# Patient Record
Sex: MALE | Race: BLACK OR AFRICAN AMERICAN | Employment: UNEMPLOYED | URBAN - METROPOLITAN AREA
[De-identification: names, ages, dates, MRNs, and addresses within clinical notes are randomized per-mention and may not be internally consistent; named-entity substitution may affect disease eponyms.]

---

## 2017-06-15 ENCOUNTER — HOSPITAL ENCOUNTER (EMERGENCY)
Age: 47
Discharge: HOME | End: 2017-06-15
Attending: EMERGENCY MEDICINE
Payer: SELF-PAY

## 2017-06-15 VITALS
DIASTOLIC BLOOD PRESSURE: 101 MMHG | BODY MASS INDEX: 29.18 KG/M2 | HEART RATE: 70 BPM | HEIGHT: 69 IN | RESPIRATION RATE: 20 BRPM | SYSTOLIC BLOOD PRESSURE: 162 MMHG | WEIGHT: 197 LBS | OXYGEN SATURATION: 100 % | TEMPERATURE: 97.8 F

## 2017-06-15 DIAGNOSIS — Z00.00 NORMAL PHYSICAL EXAM: ICD-10-CM

## 2017-06-15 DIAGNOSIS — I10 HYPERTENSION, ESSENTIAL: ICD-10-CM

## 2017-06-15 DIAGNOSIS — R76.8 ABNORMAL HEPATITIS SEROLOGY: Primary | ICD-10-CM

## 2017-06-15 PROCEDURE — 87341 HEP B SURFACE AG NEUTRLZJ IA: CPT | Performed by: PHYSICIAN ASSISTANT

## 2017-06-15 PROCEDURE — 99281 EMR DPT VST MAYX REQ PHY/QHP: CPT

## 2017-06-15 PROCEDURE — 80074 ACUTE HEPATITIS PANEL: CPT | Performed by: PHYSICIAN ASSISTANT

## 2017-06-15 RX ORDER — LISINOPRIL 5 MG/1
5 TABLET ORAL DAILY
Qty: 30 TAB | Refills: 1 | Status: SHIPPED | OUTPATIENT
Start: 2017-06-15

## 2017-06-15 NOTE — DISCHARGE INSTRUCTIONS
Hepatitis B: Care Instructions  Your Care Instructions  Hepatitis B is a disease caused by a virus that infects the liver. It spreads through infected blood, semen, and other body fluids during sexual contact. It also can spread when people share needles to inject drugs or share things that may have blood on them. These include razors and toothbrushes. Needles used for tattoos, body piercing, or acupuncture can spread the disease if they are not cleaned the right way. After you get the virus, it may be 1 to 6 months before you see symptoms. You may never notice them. You can give the disease to other people before and after you have symptoms. Hepatitis B can make you tired. It can cause a fever, nausea, vomiting, light-colored stools, and dark urine. Your skin or eyes may look yellow. This is called jaundice. Most people get better in several weeks, but it can take several months. For some people the virus stays in their bodies. If the virus stays in your body for a long time, it can cause serious liver disease. After you have had the virus and feel better, you will not get it again. Follow-up care is a key part of your treatment and safety. Be sure to make and go to all appointments, and call your doctor if you are having problems. It's also a good idea to know your test results and keep a list of the medicines you take. How can you care for yourself at home? · Reduce your activity to match your energy level. · Avoid alcohol for as long as your doctor tells you to. This may be months. Alcohol can make liver problems worse. · Make sure your doctor knows all the medicines you take. Some medicines, such as acetaminophen (Tylenol), can make liver problems worse. Do not take any new medicines unless your doctor says it is okay. · Be safe with medicines. If your doctor prescribes antiviral medicine, take it exactly as prescribed.  Call your doctor if you think you are having a problem with your medicine. · If you have nausea or vomiting, try to eat smaller meals and eat more often. · Drink plenty of fluids, enough so that your urine is light yellow or clear like water. If you have kidney, heart, or liver disease and have to limit fluids, talk with your doctor before you increase the amount of fluids you drink. · If you have itchy skin, keep cool, stay out of the sun, and wear cotton clothing. Talk to your doctor about using over-the-counter medicines, such as diphenhydramine (Benadryl) or loratadine (Claritin), to control the itching. Read and follow the instructions on the label. To prevent spreading hepatitis B  · Tell the people you live with or have sex with about your illness as soon as possible. The CDC recommends that people in close contact with an infected person get the hepatitis B vaccine. · Do not donate blood or blood products, organs, semen, or eggs (ova). · Stop all sexual activity or use latex condoms. Do this until your doctor tells you that you can no longer give hepatitis B to others. Avoid anal contact with a sex partner while you are infected. · Do not share personal items that may have your blood on them. These include razors, toothbrushes, towels, and nail files. · Use lotions or ointments to prevent chapped or broken skin. These skin problems can expose others to your blood. · Tell your doctor, dentist, and anyone else who may come in contact with your blood about your illness. · If you are pregnant, tell the doctor who will deliver your baby about your illness. Be sure your baby gets medicine to prevent infection. This should start right after birth. · If you get blood on your clothing or other fabrics, clean them well. · Be sure to carefully get rid of sanitary napkins and tampons or other disposable items that have your blood on them. Place them in sealed plastic bags before you throw them away.   · Use a solution of 1 part bleach to 10 parts water to clean surfaces that have your blood or any other body fluid (such as semen or menstrual blood) on them. These surfaces include toilet seats, countertops, and floors. · If you have long-term hepatitis B, always use latex condoms during any sexual activity. You can infect others with the virus even if you do not have symptoms. When should you call for help? Call 911 anytime you think you may need emergency care. For example, call if:  · You passed out (lost consciousness). · You vomit blood or what looks like coffee grounds. · You are suddenly confused and can't think clearly. Call your doctor now or seek immediate medical care if:  · You are dizzy or lightheaded, or you feel like you may faint. · You have signs of needing more fluids. You have sunken eyes and a dry mouth, and you pass only a little dark urine. · You have nausea and vomiting that does not go away. Watch closely for changes in your health, and be sure to contact your doctor if:  · You do not get better as expected. Where can you learn more? Go to http://luis alfredo-miguel ángel.info/. Enter [phone] in the search box to learn more about \"Hepatitis B: Care Instructions. \"  Current as of: May 24, 2016  Content Version: 11.2  © 1044-0158 [employer]. Care instructions adapted under license by [employer] (which disclaims liability or warranty for this information). If you have questions about a medical condition or this instruction, always ask your healthcare professional. [place] [address] any warranty or liability for your use of this information.

## 2017-06-15 NOTE — ED TRIAGE NOTES
Patient here because he went to donate plasma and was told that he was reactive for hepatitis B on his screenings. Sepsis Screening completed    (  )Patient meets SIRS criteria. (x  )Patient does not meet SIRS criteria.       SIRS Criteria is achieved when two or more of the following are present   Temperature < 96.8°F (36°C) or > 100.9°F (38.3°C)   Heart Rate > 90 beats per minute   Respiratory Rate > 20 breaths per minute   WBC count > 12,000 or <4,000 or > 10% bands

## 2017-06-15 NOTE — ED PROVIDER NOTES
[address]  EMERGENCY DEPARTMENT HISTORY AND PHYSICAL EXAM       Date: 6/15/2017   Patient Name: Lori Millan   YOB: 1970  Medical Record Number: [id number]    History of Presenting Illness     Chief Complaint   Patient presents with    Abnormal Lab Results        History Provided By:  patient    Additional History:   4:26 PM  Lori Millan is a 55 y.o. male presenting to the ED C/O abnormal lab results, without C/O any sx. Pt states today he went to donate plasma today and was told he had markers for Hep B, upon review of results from a plasma donation last week. Pt has donated plasma many times in the past and has not had issues. Pt denies scleral icterus, abdominal pain, vomiting, and any other symptoms or complaints at this time. Primary Care Provider: Bela Barth MD     Past History     Past Medical History:   Past Medical History:   Diagnosis Date    Glaucoma     HTN (hypertension)     Hypertension     Migraine     Vertigo         Past Surgical History:   Past Surgical History:   Procedure Laterality Date    HX FEMUR FRACTURE TX      HX ORTHOPAEDIC          Family History:   History reviewed. No pertinent family history. Social History:   Social History   Substance Use Topics    Smoking status: Current Every Day Smoker     Packs/day: 0.50    Smokeless tobacco: None    Alcohol use Yes      Comment: drank last night        Allergies:   No Known Allergies     Review of Systems   Review of Systems   Gastrointestinal: Negative for abdominal pain and vomiting. All other systems reviewed and are negative. Physical Exam  Vitals:    06/15/17 1507   BP: (!) 162/101   Pulse: 70   Resp: 20   Temp: 97.8 °F (36.6 °C)   SpO2: 100%   Weight: 89.4 kg (197 lb)   Height: 5' 9\" (1.753 m)       Physical Exam   Constitutional: He is oriented to person, place, and time. He appears well-developed and well-nourished. No distress.    HENT:   Head: Normocephalic and atraumatic. Eyes: Conjunctivae and EOM are normal. Pupils are equal, round, and reactive to light. No scleral icterus. Neck: Normal range of motion. Neck supple. Cardiovascular: Normal rate and regular rhythm. Pulmonary/Chest: Effort normal and breath sounds normal.   Abdominal: Soft. Bowel sounds are normal. He exhibits no distension. There is no tenderness. There is no rebound and no guarding. Musculoskeletal: Normal range of motion. Neurological: He is alert and oriented to person, place, and time. Skin: Skin is warm and dry. No jaundice   Psychiatric: He has a normal mood and affect. His behavior is normal.   Nursing note and vitals reviewed. Diagnostic Study Results     Labs -    No results found for this or any previous visit (from the past 12 hour(s)). Radiologic Studies -  The following have been ordered and reviewed:  No orders to display           Medical Decision Making   I am the first provider for this patient. I reviewed the vital signs, available nursing notes, past medical history, past surgical history, family history and social history. Vital Signs-Reviewed the patient's vital signs. Patient Vitals for the past 12 hrs:   Temp Pulse Resp BP SpO2   06/15/17 1507 97.8 °F (36.6 °C) 70 20 (!) 162/101 100 %       Procedures:   Procedures    ED Course:  4:26 PM  Initial assessment performed. The patients presenting problems have been discussed, and they are in agreement with the care plan formulated and outlined with them. I have encouraged them to ask questions as they arise throughout their visit. Medications Given in the ED:  Medications - No data to display    Discharge Note:  4:40 PM   Pt has been reexamined. Patient has no new complaints, changes, or physical findings. Care plan outlined and precautions discussed. Results were reviewed with the patient. All medications were reviewed with the patient; will d/c home.  All of pt's questions and concerns were addressed. Patient was instructed and agrees to follow up with hepatology, as well as to return to the ED upon further deterioration. Patient is ready to go home. Diagnosis   Clinical Impression:   1. Abnormal hepatitis serology, initial screening positive    2. Normal physical exam             Follow-up Information     Follow up With Details Comments Contact Info    Ynes Aguirre MD Schedule an appointment as soon as possible for a visit in 2 days Hepatology [address]  [address] EMERGENCY DEPT  As needed, If symptoms worsen [address] [address] [phone]          There are no discharge medications for this patient.      _______________________________   Attestations: This note is prepared by Reji Betancourt, acting as a Scribe for Tiera Aiken PA-C on 4:25 PM on 6/15/2017 . Tiera Aiken PA-C: The scribe's documentation has been prepared under my direction and personally reviewed by me in its entirety.   _______________________________

## 2017-06-16 LAB
HAV IGM SERPL QL IA: NEGATIVE
HBV CORE IGM SER QL: POSITIVE
HBV SURFACE AG SER QL: 1.79 INDEX
HBV SURFACE AG SER QL: ABNORMAL
HBV SURFACE AG SERPL QL CFM: ABNORMAL
HCV AB SER IA-ACNC: 0.16 INDEX
HCV AB SERPL QL IA: NEGATIVE
HCV COMMENT,HCGAC: ABNORMAL
HEP B SURF AG CONF C,HBAGCC: ABNORMAL
SP1: ABNORMAL
SP2: ABNORMAL
SP3: ABNORMAL

## 2017-06-19 NOTE — CALL BACK NOTE
Pt returned call to ED regarding visit & lab results. Pt understands positive Hep B results & will f/u with PCP & Dr Arsenio Turner (hepatology) as given & instructed on d/c papers.

## 2017-06-19 NOTE — CALL BACK NOTE
KRISTINA Hernández called mobile number on file.  No answer, left message for call back regarding visit

## 2017-06-21 ENCOUNTER — OFFICE VISIT (OUTPATIENT)
Dept: HEMATOLOGY | Age: 47
End: 2017-06-21

## 2017-06-21 ENCOUNTER — HOSPITAL ENCOUNTER (OUTPATIENT)
Dept: LAB | Age: 47
Discharge: HOME | End: 2017-06-21
Payer: SELF-PAY

## 2017-06-21 VITALS
TEMPERATURE: 96.6 F | RESPIRATION RATE: 16 BRPM | DIASTOLIC BLOOD PRESSURE: 81 MMHG | HEIGHT: 69 IN | SYSTOLIC BLOOD PRESSURE: 124 MMHG | OXYGEN SATURATION: 98 % | BODY MASS INDEX: 28.79 KG/M2 | HEART RATE: 73 BPM | WEIGHT: 194.4 LBS

## 2017-06-21 DIAGNOSIS — B19.10 HEP B W/O COMA: ICD-10-CM

## 2017-06-21 DIAGNOSIS — B19.10 HEP B W/O COMA: Primary | ICD-10-CM

## 2017-06-21 LAB
ALBUMIN SERPL BCP-MCNC: 4 G/DL (ref 3.4–5)
ALBUMIN/GLOB SERPL: 1.3 {RATIO} (ref 0.8–1.7)
ALP SERPL-CCNC: 76 U/L (ref 45–117)
ALT SERPL-CCNC: 31 U/L (ref 16–61)
ANION GAP BLD CALC-SCNC: 8 MMOL/L (ref 3–18)
AST SERPL W P-5'-P-CCNC: 26 U/L (ref 15–37)
BASOPHILS # BLD AUTO: 0 K/UL (ref 0–0.06)
BASOPHILS # BLD: 0 % (ref 0–2)
BILIRUB DIRECT SERPL-MCNC: 0.1 MG/DL (ref 0–0.2)
BILIRUB SERPL-MCNC: 0.4 MG/DL (ref 0.2–1)
BUN SERPL-MCNC: 15 MG/DL (ref 7–18)
BUN/CREAT SERPL: 15 (ref 12–20)
CALCIUM SERPL-MCNC: 8.9 MG/DL (ref 8.5–10.1)
CHLORIDE SERPL-SCNC: 102 MMOL/L (ref 100–108)
CO2 SERPL-SCNC: 29 MMOL/L (ref 21–32)
CREAT SERPL-MCNC: 1.02 MG/DL (ref 0.6–1.3)
DIFFERENTIAL METHOD BLD: NORMAL
EOSINOPHIL # BLD: 0.2 K/UL (ref 0–0.4)
EOSINOPHIL NFR BLD: 2 % (ref 0–5)
ERYTHROCYTE [DISTWIDTH] IN BLOOD BY AUTOMATED COUNT: 13.7 % (ref 11.6–14.5)
GLOBULIN SER CALC-MCNC: 3.2 G/DL (ref 2–4)
GLUCOSE SERPL-MCNC: 81 MG/DL (ref 74–99)
HCT VFR BLD AUTO: 41.1 % (ref 36–48)
HGB BLD-MCNC: 13.7 G/DL (ref 13–16)
LYMPHOCYTES # BLD AUTO: 41 % (ref 21–52)
LYMPHOCYTES # BLD: 3 K/UL (ref 0.9–3.6)
MCH RBC QN AUTO: 29.1 PG (ref 24–34)
MCHC RBC AUTO-ENTMCNC: 33.3 G/DL (ref 31–37)
MCV RBC AUTO: 87.4 FL (ref 74–97)
MONOCYTES # BLD: 0.5 K/UL (ref 0.05–1.2)
MONOCYTES NFR BLD AUTO: 7 % (ref 3–10)
NEUTS SEG # BLD: 3.6 K/UL (ref 1.8–8)
NEUTS SEG NFR BLD AUTO: 50 % (ref 40–73)
PLATELET # BLD AUTO: 290 K/UL (ref 135–420)
PMV BLD AUTO: 10.9 FL (ref 9.2–11.8)
POTASSIUM SERPL-SCNC: 4.3 MMOL/L (ref 3.5–5.5)
PROT SERPL-MCNC: 7.2 G/DL (ref 6.4–8.2)
RBC # BLD AUTO: 4.7 M/UL (ref 4.7–5.5)
SODIUM SERPL-SCNC: 139 MMOL/L (ref 136–145)
WBC # BLD AUTO: 7.3 K/UL (ref 4.6–13.2)

## 2017-06-21 PROCEDURE — 80076 HEPATIC FUNCTION PANEL: CPT | Performed by: INTERNAL MEDICINE

## 2017-06-21 PROCEDURE — 86707 HEPATITIS BE ANTIBODY: CPT | Performed by: INTERNAL MEDICINE

## 2017-06-21 PROCEDURE — 86704 HEP B CORE ANTIBODY TOTAL: CPT | Performed by: INTERNAL MEDICINE

## 2017-06-21 PROCEDURE — 36415 COLL VENOUS BLD VENIPUNCTURE: CPT | Performed by: INTERNAL MEDICINE

## 2017-06-21 PROCEDURE — 87389 HIV-1 AG W/HIV-1&-2 AB AG IA: CPT | Performed by: INTERNAL MEDICINE

## 2017-06-21 PROCEDURE — 87340 HEPATITIS B SURFACE AG IA: CPT | Performed by: INTERNAL MEDICINE

## 2017-06-21 PROCEDURE — 87350 HEPATITIS BE AG IA: CPT | Performed by: INTERNAL MEDICINE

## 2017-06-21 PROCEDURE — 87517 HEPATITIS B DNA QUANT: CPT | Performed by: INTERNAL MEDICINE

## 2017-06-21 PROCEDURE — 86692 HEPATITIS DELTA AGENT ANTBDY: CPT | Performed by: INTERNAL MEDICINE

## 2017-06-21 PROCEDURE — 85025 COMPLETE CBC W/AUTO DIFF WBC: CPT | Performed by: INTERNAL MEDICINE

## 2017-06-21 PROCEDURE — 86706 HEP B SURFACE ANTIBODY: CPT | Performed by: INTERNAL MEDICINE

## 2017-06-21 PROCEDURE — 80048 BASIC METABOLIC PNL TOTAL CA: CPT | Performed by: INTERNAL MEDICINE

## 2017-06-21 NOTE — PROGRESS NOTES
[address] Shady Ramos MD, PATRICE Orta PA-C Stasia James, NP        at [address], [address] [place]  [address].     [phone]     FAX: [phone]    at [facility], [address] [place] [address], [address]     [phone]     FAX: [phone]       No care team member to display      Problem List  Date Reviewed: 7/16/2017          Codes Class Noted    Chronic hepatitis B [facility]) ICD-10-CM: B18.1  ICD-9-CM: 070.32  7/16/2017              The physicians listed above have asked me to see Crispin Valdovinos in consultation regarding hepatitis B virus and its management. No medical records were available for review when the patient was here for the appointment. The patient is a 52 y.o. Black male who was told he had HBV after attempting to donate plasma. Serologic evaluation for markers of chronic liver disease were positive for a low titer HBsurface antigen and anti-HBcore. There are no obvious risk factors for exposure to HBV. Imaging of the liver was not performed. An assessment of liver fibrosis with biopsy or elastography has not been performed. The patient has never been treated for HBV. The most recent laboratory studies indicate that the liver transaminases are normal, ALP is normal, tests of hepatic synthetic and metabolic function are normal, and the platelet count is normal.    The patient has no symptoms which could be attributed to the liver disorder. The patient completes all daily activities without any functional limitations. The patient has not experienced fatigue, pain in the right side over the liver,       ALLERGIES  No Known Allergies    MEDICATIONS  Current Outpatient Prescriptions   Medication Sig    lisinopril (PRINIVIL, ZESTRIL) 5 mg tablet Take 1 Tab by mouth daily.      No current facility-administered medications for this visit. SYSTEM REVIEW NOT RELATED TO LIVER DISEASE OR REVIEWED ABOVE:  Constitution systems: Negative for fever, chills, weight gain, weight loss. Eyes: Negative for visual changes. ENT: Negative for sore throat, painful swallowing. Respiratory: Negative for cough, hemoptysis, SOB. Cardiology: Negative for chest pain, palpitations. GI:  Negative for constipation or diarrhea. : Negative for urinary frequency, dysuria, hematuria, nocturia. Skin: Negative for rash. Hematology: Negative for easy bruising, blood clots. Musculo-skelatal: Negative for back pain, muscle pain, weakness. Neurologic: Negative for headaches, dizziness, vertigo, memory problems not related to HE. Psychology: Negative for anxiety, depression. FAMILY HISTORY:  The patient has no knowledge of the father's medical condition. The mother has the following chronic diseases: heart disease. There is no family history of liver disease. SOCIAL HISTORY:  The patient is engaged to be . The patient has 1 child. The patient currently smokes 2 cigarettes daily. The patient has been abstinent from alcohol since 4/2017. The patient currently works full time as a  for a hotel. PHYSICAL EXAMINATION:  Visit Vitals    /81 (BP 1 Location: Left arm, BP Patient Position: Sitting)    Pulse 73    Temp 96.6 °F (35.9 °C) (Tympanic)    Resp 16    Ht 5' 9\" (1.753 m)    Wt 194 lb 6.4 oz (88.2 kg)    SpO2 98%    BMI 28.71 kg/m2     General: No acute distress. Eyes: Sclera anicteric. ENT: No oral lesions. Thyroid normal.  Nodes: No adenopathy. Skin: No spider angiomata. No jaundice. No palmar erythema. Respiratory: Lungs clear to auscultation. Cardiovascular: Regular heart rate. No murmurs. No JVD. Abdomen: Soft non-tender. Liver size normal to percussion/palpation. Spleen not palpable. No obvious ascites. Extremities: No edema. No muscle wasting. No gross arthritic changes. Neurologic: Alert and oriented. Cranial nerves grossly intact. No asterixis. LABORATORY STUDIES:  Liver [place] of [address] & Units 6/21/2017 8/13/2016 4/5/2016   WBC 4.6 - 13.2 K/uL 7.3 8.5 7.8   ANC 1.8 - 8.0 K/UL 3.6 3.9 3.6   HGB 13.0 - 16.0 g/dL 13.7 13.0 13.2    - 420 K/uL 290 271 295   AST 15 - 37 U/L 26 37 57 (H)   ALT 16 - 61 U/L 31 54 60   Alk Phos 45 - 117 U/L 76 77 73   Bili, Total 0.2 - 1.0 MG/DL 0.4 0.2 0.3   Bili, Direct 0.0 - 0.2 MG/DL 0.1     Albumin 3.4 - 5.0 g/dL 4.0 3.8 3.9   BUN 7.0 - 18 MG/DL 15 16 14   Creat 0.6 - 1.3 MG/DL 1.02 1.10 0.93   Na 136 - 145 mmol/L 139 139 139   K 3.5 - 5.5 mmol/L 4.3 3.4 (L) 4.4   Cl 100 - 108 mmol/L 102 104 104   CO2 21 - 32 mmol/L 29 27 28   Glucose 74 - 99 mg/dL 81 94 95     SEROLOGIES:  Serologies Latest Ref Rng & Units 6/21/2017   Hep B Surface Ag <1.00 Index 0.44   Hep B Surface Ag Interp NEG   NEGATIVE   Hep B Core Ab, Total NEGATIVE   Positive (A)   Hep B Surface Ab >10.0 mIU/mL 6.31 (L)   Hep B Surface Ab Interp POS   NEGATIVE (A)   Hep C Ab NEG        Serologies Latest Ref Rng & Units 6/15/2017   Hep B Surface Ag <1.00 Index 1.79 (H)   Hep B Surface Ag Interp NEG   INITIALLY REACTIVE, TO BE CONFIRMED (A)   Hep B Core Ab, Total NEGATIVE      Hep B Surface Ab >10.0 mIU/mL    Hep B Surface Ab Interp POS      Hep C Ab NEG   NEGATIVE     LIVER HISTOLOGY:  Not available or performed    ENDOSCOPIC PROCEDURES:  Not available or performed    RADIOLOGY:  Not available or performed    OTHER TESTING:  Not available or performed    ASSESSMENT AND PLAN:  He was exposed to HBV at some point in the past but has resolved HBV infection. Persistent normal liver enzymes. Liver function is normal.  The platelet count is normal.      Serologic testing to help define the cause of the laboratory abnormality were all negative.       Will perform laboratory testing to monitor liver function and degree of liver injury. This included BMP, hepatic panel, CBC with platelet count,     No further teesting for HBV is necessary. The patient was directed to continue all current medications at the current dosages. There are no contraindications for the patient to take any medications that are necessary for treatment of other medical issues. The patient was counseled regarding alcohol consumption. Vaccination for viral hepatitis B is not needed. The patient has serologic evidence of prior exposure or vaccination with immunity. The need for vaccination against viral hepatitis A will be assessed with serologic and instituted as appropriate. All of the above issues were discussed with the patient. All questions were answered. The patient expressed a clear understanding of the above. No follow-up at [address] is needed. I would be glad to see the patient back for follow-up at any time in the future if the clinical situation changes.     Adelia Meza MD  Liver [place] of [address] [place] [address], [address] [place] [address] [facility], [address] [place] [address] Street [address]  [phone]

## 2017-06-21 NOTE — MR AVS SNAPSHOT
Visit Information Date & Time Provider Department Dept. Phone Encounter #  
 6/21/2017  3:50 PM Leigh Kevin MD [address] [place] [id number] of [address] [place] [address] [id number] Follow-up Instructions Return in about 2 weeks (around 7/5/2017). Your Appointments 6/21/2017  3:50 PM  
New Patient with Leigh Kevin MD  
[facility] [place] [facility] Appt Note: NP est. Liver Failure. Referred by Dr. Carney Comment from [address] [facility] 322 [facility] [address] [facility] Upcoming Health Maintenance Date Due Pneumococcal 19-64 Medium Risk (1 of 1 - PPSV23) 7/1/1989 DTaP/Tdap/Td series (1 - Tdap) 7/1/1991 INFLUENZA AGE 9 TO ADULT 8/1/2017 Allergies as of 6/21/2017  Review Complete On: 6/21/2017 By: Claudia Espinoza No Known Allergies Current Immunizations  Never Reviewed No immunizations on file. Not reviewed this visit You Were Diagnosed With   
  
 Codes Comments Hep B w/o coma    -  Primary ICD-10-CM: B19.10 ICD-9-CM: 070.30 Vitals BP Pulse Temp Resp Height(growth percentile) 124/81 (BP 1 Location: Left arm, BP Patient Position: Sitting) 73 96.6 °F (35.9 °C) (Tympanic) 16 5' 9\" (1.753 m) Weight(growth percentile) SpO2 BMI Smoking Status 194 lb 6.4 oz (88.2 kg) 98% 28.71 kg/m2 Current Every Day Smoker Vitals History BMI and BSA Data Body Mass Index Body Surface Area 28.71 kg/m 2 2.07 m 2 Your Updated Medication List  
  
   
This list is accurate as of: 6/21/17  3:25 PM.  Always use your most recent med list.  
  
  
  
  
 lisinopril 5 mg tablet Commonly known as:  Akanksha Vásquez Take 1 Tab by mouth daily. Follow-up Instructions Return in about 2 weeks (around 7/5/2017). To-Do List   
 06/21/2017   Lab:  CBC WITH AUTOMATED DIFF   
  
 06/21/2017 Lab:  HEP B SURFACE AB   
  
 06/21/2017 Lab:  HEP B SURFACE AG   
  
 06/21/2017 Lab:  HEPATIC FUNCTION PANEL   
  
 06/21/2017 Lab:  HEPATITIS B CORE AB, TOTAL   
  
 06/21/2017 Lab:  HEPATITIS B, QT, PCR   
  
 06/21/2017 Lab:  HEPATITIS BE AB   
  
 06/21/2017 Lab:  HEPATITIS BE AG   
  
 06/21/2017 Lab:  HEPATITIS D TOTAL   
  
 06/21/2017 Lab:  HIV 1/2 AG/AB, 4TH GENERATION,W RFLX CONFIRM   
  
 06/21/2017 Lab:  METABOLIC PANEL, BASIC Introducing hospitals & HEALTH SERVICES! [address] introduces [employer] patient portal. Now you can access parts of your medical record, email your doctor's office, and request medication refills online. 1. In your internet browser, go to [email or web address] 2. Click on the First Time User? Click Here link in the Sign In box. You will see the New Member Sign Up page. 3. Enter your [employer] Access Code exactly as it appears below. You will not need to use this code after youve completed the sign-up process. If you do not sign up before the expiration date, you must request a new code. · [employer] Access Code: [email or web address] Expires: 9/13/2017  4:40 PM 
 
4. Enter the last four digits of your Social Security Number (xxxx) and Date of Birth (mm/dd/yyyy) as indicated and click Submit. You will be taken to the next sign-up page. 5. Create a [employer] ID. This will be your [employer] login ID and cannot be changed, so think of one that is secure and easy to remember. 6. Create a [employer] password. You can change your password at any time. 7. Enter your Password Reset Question and Answer. This can be used at a later time if you forget your password. 8. Enter your e-mail address. You will receive e-mail notification when new information is available in [address]. 9. Click Sign Up. You can now view and download portions of your medical record. 10. Click the Download Summary menu link to download a portable copy of your medical information. If you have questions, please visit the Frequently Asked Questions section of the [employer] website. Remember, [employer] is NOT to be used for urgent needs. For medical emergencies, dial 911. Now available from your iPhone and Android! Please provide this summary of care documentation to your next provider. If you have any questions after today's visit, please call [phone].

## 2017-06-22 LAB
HBV DNA SERPL NAA+PROBE-ACNC: 170 IU/ML
HBV DNA SERPL NAA+PROBE-LOG IU: 2.23 LOG10IU/ML
HBV SURFACE AB SER QL IA: NEGATIVE
HBV SURFACE AB SERPL IA-ACNC: 6.31 MIU/ML
HBV SURFACE AG SER QL: 0.44 INDEX
HBV SURFACE AG SER QL: NEGATIVE
HEP BS AB COMMENT,HBSAC: ABNORMAL
HIV 1+2 AB+HIV1 P24 AG SERPL QL IA: NONREACTIVE
HIV12 RESULT COMMENT, HHIVC: NORMAL
TEST INFORMATION: NORMAL

## 2017-06-27 LAB
HBV CORE AB SERPL QL IA: POSITIVE
HBV E AB SERPL QL IA: POSITIVE
HBV E AG SERPL QL IA: NEGATIVE

## 2017-06-29 LAB — HDV AB SER QL IA: NEGATIVE

## 2017-07-05 ENCOUNTER — OFFICE VISIT (OUTPATIENT)
Dept: HEMATOLOGY | Age: 47
End: 2017-07-05

## 2017-07-05 VITALS
SYSTOLIC BLOOD PRESSURE: 136 MMHG | RESPIRATION RATE: 16 BRPM | OXYGEN SATURATION: 98 % | BODY MASS INDEX: 28.64 KG/M2 | WEIGHT: 193.4 LBS | HEART RATE: 71 BPM | HEIGHT: 69 IN | TEMPERATURE: 98 F | DIASTOLIC BLOOD PRESSURE: 88 MMHG

## 2017-07-05 DIAGNOSIS — R76.8 HEPATITIS B CORE ANTIBODY POSITIVE: Primary | ICD-10-CM

## 2017-07-05 NOTE — MR AVS SNAPSHOT
Visit Information Date & Time Provider Department Dept. Phone Encounter #  
 7/5/2017  2:15 PM Selene Aaron MD [facility] [place] [phone] [id number] Follow-up Instructions Return in about 3 months (around 10/5/2017) for MLS. Upcoming Health Maintenance Date Due Pneumococcal 19-64 Medium Risk (1 of 1 - PPSV23) 7/1/1989 DTaP/Tdap/Td series (1 - Tdap) 7/1/1991 INFLUENZA AGE 9 TO ADULT 8/1/2017 Allergies as of 7/5/2017  Review Complete On: 7/5/2017 By: Ramona Zee No Known Allergies Current Immunizations  Never Reviewed No immunizations on file. Not reviewed this visit Vitals BP Pulse Temp Resp Height(growth percentile) Weight(growth percentile) 136/88 (BP 1 Location: Left arm, BP Patient Position: Sitting) 71 98 °F (36.7 °C) (Tympanic) 16 5' 9\" (1.753 m) 193 lb 6.4 oz (87.7 kg) SpO2 BMI Smoking Status 98% 28.56 kg/m2 Current Every Day Smoker Vitals History BMI and BSA Data Body Mass Index Body Surface Area 28.56 kg/m 2 2.07 m 2 Your Updated Medication List  
  
   
This list is accurate as of: 7/5/17  3:22 PM.  Always use your most recent med list.  
  
  
  
  
 lisinopril 5 mg tablet Commonly known as:  Мария Savage Take 1 Tab by mouth daily. Follow-up Instructions Return in about 3 months (around 10/5/2017) for MLS. Introducing [facility]! Jaime Sparks introduces [employer] patient portal. Now you can access parts of your medical record, email your doctor's office, and request medication refills online. 1. In your internet browser, go to [email or web address] 2. Click on the First Time User? Click Here link in the Sign In box. You will see the New Member Sign Up page. 3. Enter your [employer] Access Code exactly as it appears below.  You will not need to use this code after youve completed the sign-up process. If you do not sign up before the expiration date, you must request a new code. · [employer] Access Code: [email or web address] Expires: 9/13/2017  4:40 PM 
 
4. Enter the last four digits of your Social Security Number (xxxx) and Date of Birth (mm/dd/yyyy) as indicated and click Submit. You will be taken to the next sign-up page. 5. Create a [employer] ID. This will be your [employer] login ID and cannot be changed, so think of one that is secure and easy to remember. 6. Create a [employer] password. You can change your password at any time. 7. Enter your Password Reset Question and Answer. This can be used at a later time if you forget your password. 8. Enter your e-mail address. You will receive e-mail notification when new information is available in [address]. 9. Click Sign Up. You can now view and download portions of your medical record. 10. Click the Download Summary menu link to download a portable copy of your medical information. If you have questions, please visit the Frequently Asked Questions section of the [employer] website. Remember, [employer] is NOT to be used for urgent needs. For medical emergencies, dial 911. Now available from your iPhone and Android! Please provide this summary of care documentation to your next provider. If you have any questions after today's visit, please call [phone].

## 2017-07-16 PROBLEM — B18.1 CHRONIC HEPATITIS B (HCC): Status: ACTIVE | Noted: 2017-07-16

## 2017-07-19 ENCOUNTER — TELEPHONE (OUTPATIENT)
Dept: HEMATOLOGY | Age: 47
End: 2017-07-19

## 2017-07-25 ENCOUNTER — DOCUMENTATION ONLY (OUTPATIENT)
Dept: HEMATOLOGY | Age: 47
End: 2017-07-25

## 2017-07-25 NOTE — PROGRESS NOTES
Office visit and labs faxed to Genevieve Becker at the [address] [place] [address] (f: [phone]) per request.

## 2017-10-07 PROBLEM — R76.8 HEPATITIS B CORE ANTIBODY POSITIVE: Status: ACTIVE | Noted: 2017-07-16

## 2017-10-07 NOTE — PROGRESS NOTES
[address] [place], [address], [place], [place]       Asher Blanchard, INDY Nicole, [facility]   PATRICE Carlos NP        at [address], [address] [address]     [phone]     FAX: [phone]    at [address], [id number] [place] [address], [address]     [phone]     FAX: [phone]       No care team member to display      Problem List  Date Reviewed: 10/7/2017          Codes Class Noted    Hepatitis B core antibody positive ICD-10-CM: R76.8  ICD-9-CM: 795.79  7/16/2017              Lloyd Polanco returns to the [address] for management of chronic HBV. he active problem list, all pertinent past medical history, medications, radiologic findings and laboratory findings related to the liver disorder were reviewed with the patient. The patient is a 52 y.o. Black male who was told he had HBV after attempting to donate plasma. Serologic evaluation for markers of chronic liver disease were positive for anti-HBcore but negative for HBsurface antigen, anti-HBsurface and anti-HCV. Imaging of the liver was not performed. An assessment of liver fibrosis with biopsy or elastography has not been performed. The patient has never been treated for HBV. The most recent laboratory studies indicate that the liver transaminases are normal, ALP is normal, tests of hepatic synthetic and metabolic function are normal, and the platelet count is normal.    The patient has no symptoms which could be attributed to the liver disorder. The patient completes all daily activities without any functional limitations.       The patient has not experienced fatigue, pain in the right side over the liver,       ALLERGIES  No Known Allergies    MEDICATIONS  Current Outpatient Prescriptions   Medication Sig    lisinopril (PRINIVIL, ZESTRIL) 5 mg tablet Take 1 Tab by mouth daily. No current facility-administered medications for this visit. SYSTEM REVIEW NOT RELATED TO LIVER DISEASE OR REVIEWED ABOVE:  Constitution systems: Negative for fever, chills, weight gain, weight loss. Eyes: Negative for visual changes. ENT: Negative for sore throat, painful swallowing. Respiratory: Negative for cough, hemoptysis, SOB. Cardiology: Negative for chest pain, palpitations. GI:  Negative for constipation or diarrhea. : Negative for urinary frequency, dysuria, hematuria, nocturia. Skin: Negative for rash. Hematology: Negative for easy bruising, blood clots. Musculo-skelatal: Negative for back pain, muscle pain, weakness. Neurologic: Negative for headaches, dizziness, vertigo, memory problems not related to HE. Psychology: Negative for anxiety, depression. FAMILY HISTORY:  The patient has no knowledge of the father's medical condition. The mother has the following chronic diseases: heart disease. There is no family history of liver disease. SOCIAL HISTORY:  The patient is engaged to be . The patient has 1 child. The patient currently smokes 2 cigarettes daily. The patient has been abstinent from alcohol since 4/2017. The patient currently works full time as a  for a hotel. PHYSICAL EXAMINATION:  Visit Vitals    /88 (BP 1 Location: Left arm, BP Patient Position: Sitting)    Pulse 71    Temp 98 °F (36.7 °C) (Tympanic)    Resp 16    Ht 5' 9\" (1.753 m)    Wt 193 lb 6.4 oz (87.7 kg)    SpO2 98%    BMI 28.56 kg/m2     General: No acute distress. Eyes: Sclera anicteric. ENT: No oral lesions. Thyroid normal.  Nodes: No adenopathy. Skin: No spider angiomata. No jaundice. No palmar erythema. Respiratory: Lungs clear to auscultation. Cardiovascular: Regular heart rate. No murmurs. No JVD. Abdomen: Soft non-tender.   Liver size normal to percussion/palpation. Spleen not palpable. No obvious ascites. Extremities: No edema. No muscle wasting. No gross arthritic changes. Neurologic: Alert and oriented. Cranial nerves grossly intact. No asterixis. LABORATORY STUDIES:  [facility] [address] [place] [address] & Units 6/21/2017 8/13/2016 4/5/2016   WBC 4.6 - 13.2 K/uL 7.3 8.5 7.8   ANC 1.8 - 8.0 K/UL 3.6 3.9 3.6   HGB 13.0 - 16.0 g/dL 13.7 13.0 13.2    - 420 K/uL 290 271 295   AST 15 - 37 U/L 26 37 57 (H)   ALT 16 - 61 U/L 31 54 60   Alk Phos 45 - 117 U/L 76 77 73   Bili, Total 0.2 - 1.0 MG/DL 0.4 0.2 0.3   Bili, Direct 0.0 - 0.2 MG/DL 0.1     Albumin 3.4 - 5.0 g/dL 4.0 3.8 3.9   BUN 7.0 - 18 MG/DL 15 16 14   Creat 0.6 - 1.3 MG/DL 1.02 1.10 0.93   Na 136 - 145 mmol/L 139 139 139   K 3.5 - 5.5 mmol/L 4.3 3.4 (L) 4.4   Cl 100 - 108 mmol/L 102 104 104   CO2 21 - 32 mmol/L 29 27 28   Glucose 74 - 99 mg/dL 81 94 95     SEROLOGIES:  Serologies Latest Ref Rng & Units 6/21/2017   Hep B Surface Ag <1.00 Index 0.44   Hep B Surface Ag Interp NEG   NEGATIVE   Hep B Core Ab, Total NEGATIVE   Positive (A)   Hep B Surface Ab >10.0 mIU/mL 6.31 (L)   Hep B Surface Ab Interp POS   NEGATIVE (A)     Serologies Latest Ref Rng & Units 6/15/2017   Hep B Surface Ag <1.00 Index 1.79 (H)   Hep B Surface Ag Interp NEG   INITIALLY REACTIVE, TO BE CONFIRMED (A)   Hep C Ab NEG   NEGATIVE     LIVER HISTOLOGY:  Not available or performed    ENDOSCOPIC PROCEDURES:  Not available or performed    RADIOLOGY:  Not available or performed    OTHER TESTING:  Not available or performed    ASSESSMENT AND PLAN:  He was exposed to HBV at some point in the past but has resolved HBV infection. Persistent normal liver enzymes. Liver function is normal.  The platelet count is normal.      Serologic testing to help define the cause of the laboratory abnormality were all negative. Will perform laboratory testing to monitor liver function and degree of liver injury.   This included BMP, hepatic panel, CBC with platelet count,     No further teesting for HBV is necessary. The patient was directed to continue all current medications at the current dosages. There are no contraindications for the patient to take any medications that are necessary for treatment of other medical issues. The patient was counseled regarding alcohol consumption. Vaccination for viral hepatitis B is not needed. The patient has serologic evidence of prior exposure or vaccination with immunity. The need for vaccination against viral hepatitis A will be assessed with serologic and instituted as appropriate. All of the above issues were discussed with the patient. All questions were answered. The patient expressed a clear understanding of the above. No follow-up at [address] is needed. I would be glad to see the patient back for follow-up at any time in the future if the clinical situation changes.     Jolly Berg MD  [facility] of [address] [place] [address], [address] [place] [address], [address] [place] [address] Street [address]  [phone]

## 2019-08-22 ENCOUNTER — HOSPITAL ENCOUNTER (EMERGENCY)
Age: 49
Discharge: HOME | End: 2019-08-22
Attending: EMERGENCY MEDICINE | Admitting: EMERGENCY MEDICINE
Payer: COMMERCIAL

## 2019-08-22 VITALS
BODY MASS INDEX: 35.42 KG/M2 | WEIGHT: 253 LBS | RESPIRATION RATE: 16 BRPM | HEIGHT: 71 IN | HEART RATE: 77 BPM | DIASTOLIC BLOOD PRESSURE: 95 MMHG | OXYGEN SATURATION: 98 % | TEMPERATURE: 98.8 F | SYSTOLIC BLOOD PRESSURE: 147 MMHG

## 2019-08-22 DIAGNOSIS — R36.9 ABNORMAL PENILE DISCHARGE, WITH BLOOD: Primary | ICD-10-CM

## 2019-08-22 LAB
ALBUMIN SERPL-MCNC: 3.7 G/DL (ref 3.5–5)
ALBUMIN/GLOB SERPL: 0.9 {RATIO} (ref 1.1–2.2)
ALP SERPL-CCNC: 86 U/L (ref 45–117)
ALT SERPL-CCNC: 62 U/L (ref 12–78)
ANION GAP SERPL CALC-SCNC: 5 MMOL/L (ref 5–15)
APPEARANCE UR: ABNORMAL
APPEARANCE UR: ABNORMAL
AST SERPL-CCNC: 43 U/L (ref 15–37)
BACTERIA URNS QL MICRO: NEGATIVE /HPF
BASOPHILS # BLD: 0 K/UL (ref 0–0.1)
BASOPHILS NFR BLD: 1 % (ref 0–1)
BILIRUB SERPL-MCNC: 0.4 MG/DL (ref 0.2–1)
BILIRUB UR QL: NEGATIVE
BILIRUB UR QL: NEGATIVE
BUN SERPL-MCNC: 17 MG/DL (ref 6–20)
BUN/CREAT SERPL: 12 (ref 12–20)
CALCIUM SERPL-MCNC: 8.7 MG/DL (ref 8.5–10.1)
CHLORIDE SERPL-SCNC: 103 MMOL/L (ref 97–108)
CO2 SERPL-SCNC: 32 MMOL/L (ref 21–32)
COLOR UR: ABNORMAL
COLOR UR: ABNORMAL
CREAT SERPL-MCNC: 1.39 MG/DL (ref 0.7–1.3)
DIFFERENTIAL METHOD BLD: ABNORMAL
EOSINOPHIL # BLD: 0.2 K/UL (ref 0–0.4)
EOSINOPHIL NFR BLD: 2 % (ref 0–7)
EPITH CASTS URNS QL MICRO: ABNORMAL /LPF
ERYTHROCYTE [DISTWIDTH] IN BLOOD BY AUTOMATED COUNT: 14.7 % (ref 11.5–14.5)
GLOBULIN SER CALC-MCNC: 3.9 G/DL (ref 2–4)
GLUCOSE SERPL-MCNC: 94 MG/DL (ref 65–100)
GLUCOSE UR STRIP.AUTO-MCNC: NEGATIVE MG/DL
GLUCOSE UR STRIP.AUTO-MCNC: NEGATIVE MG/DL
HCT VFR BLD AUTO: 39.9 % (ref 36.6–50.3)
HGB BLD-MCNC: 12.6 G/DL (ref 12.1–17)
HGB UR QL STRIP: NEGATIVE
HGB UR QL STRIP: NEGATIVE
IMM GRANULOCYTES # BLD AUTO: 0 K/UL (ref 0–0.04)
IMM GRANULOCYTES NFR BLD AUTO: 0 % (ref 0–0.5)
KETONES UR QL STRIP.AUTO: NEGATIVE MG/DL
KETONES UR QL STRIP.AUTO: NEGATIVE MG/DL
LEUKOCYTE ESTERASE UR QL STRIP.AUTO: ABNORMAL
LEUKOCYTE ESTERASE UR QL STRIP.AUTO: ABNORMAL
LYMPHOCYTES # BLD: 1.8 K/UL (ref 0.8–3.5)
LYMPHOCYTES NFR BLD: 23 % (ref 12–49)
MCH RBC QN AUTO: 28.2 PG (ref 26–34)
MCHC RBC AUTO-ENTMCNC: 31.6 G/DL (ref 30–36.5)
MCV RBC AUTO: 89.3 FL (ref 80–99)
MONOCYTES # BLD: 1.1 K/UL (ref 0–1)
MONOCYTES NFR BLD: 14 % (ref 5–13)
MUCOUS THREADS URNS QL MICRO: ABNORMAL /LPF
NEUTS SEG # BLD: 4.6 K/UL (ref 1.8–8)
NEUTS SEG NFR BLD: 60 % (ref 32–75)
NITRITE UR QL STRIP.AUTO: NEGATIVE
NITRITE UR QL STRIP.AUTO: NEGATIVE
NRBC # BLD: 0 K/UL (ref 0–0.01)
NRBC BLD-RTO: 0 PER 100 WBC
PH UR STRIP: 6.5 [PH] (ref 5–8)
PH UR STRIP: 6.5 [PH] (ref 5–8)
PLATELET # BLD AUTO: 264 K/UL (ref 150–400)
PMV BLD AUTO: 11.6 FL (ref 8.9–12.9)
POTASSIUM SERPL-SCNC: 4.3 MMOL/L (ref 3.5–5.1)
PROT SERPL-MCNC: 7.6 G/DL (ref 6.4–8.2)
PROT UR STRIP-MCNC: NEGATIVE MG/DL
PROT UR STRIP-MCNC: NEGATIVE MG/DL
RBC # BLD AUTO: 4.47 M/UL (ref 4.1–5.7)
RBC #/AREA URNS HPF: ABNORMAL /HPF (ref 0–5)
SODIUM SERPL-SCNC: 140 MMOL/L (ref 136–145)
SP GR UR REFRACTOMETRY: 1.03 (ref 1–1.03)
SP GR UR REFRACTOMETRY: 1.03 (ref 1–1.03)
UA: UC IF INDICATED,UAUC: ABNORMAL
UROBILINOGEN UR QL STRIP.AUTO: 1 EU/DL (ref 0.2–1)
UROBILINOGEN UR QL STRIP.AUTO: 1 EU/DL (ref 0.2–1)
WBC # BLD AUTO: 7.7 K/UL (ref 4.1–11.1)
WBC URNS QL MICRO: >100 /HPF (ref 0–4)

## 2019-08-22 PROCEDURE — 99284 EMERGENCY DEPT VISIT MOD MDM: CPT

## 2019-08-22 PROCEDURE — 74011250637 HC RX REV CODE- 250/637: Performed by: EMERGENCY MEDICINE

## 2019-08-22 PROCEDURE — 74011250636 HC RX REV CODE- 250/636: Performed by: EMERGENCY MEDICINE

## 2019-08-22 PROCEDURE — 81001 URINALYSIS AUTO W/SCOPE: CPT

## 2019-08-22 PROCEDURE — 80053 COMPREHEN METABOLIC PANEL: CPT

## 2019-08-22 PROCEDURE — 96372 THER/PROPH/DIAG INJ SC/IM: CPT

## 2019-08-22 PROCEDURE — 87086 URINE CULTURE/COLONY COUNT: CPT

## 2019-08-22 PROCEDURE — 87491 CHLMYD TRACH DNA AMP PROBE: CPT

## 2019-08-22 PROCEDURE — 85025 COMPLETE CBC W/AUTO DIFF WBC: CPT

## 2019-08-22 PROCEDURE — 87110 CHLAMYDIA CULTURE: CPT

## 2019-08-22 PROCEDURE — 36415 COLL VENOUS BLD VENIPUNCTURE: CPT

## 2019-08-22 RX ORDER — HYDROCHLOROTHIAZIDE 12.5 MG/1
1 CAPSULE ORAL DAILY
COMMUNITY
Start: 2019-05-05

## 2019-08-22 RX ORDER — AZITHROMYCIN 250 MG/1
1000 TABLET, FILM COATED ORAL
Status: COMPLETED | OUTPATIENT
Start: 2019-08-22 | End: 2019-08-22

## 2019-08-22 RX ADMIN — LIDOCAINE HYDROCHLORIDE 250 MG: 10 INJECTION, SOLUTION EPIDURAL; INFILTRATION; INTRACAUDAL; PERINEURAL at 21:21

## 2019-08-22 RX ADMIN — AZITHROMYCIN 1000 MG: 250 TABLET, FILM COATED ORAL at 21:21

## 2019-08-23 NOTE — DISCHARGE INSTRUCTIONS
Your exam today suggest that you likely have gonorrhea or chlamydia. You were treated with antibiotics for both gonorrhea and chlamydia today. A culture swab was sent, and the laboratory will call you if the results are positive. We recommend safe sexual practices including condom use with all sexual partners.

## 2019-08-23 NOTE — ED NOTES
Patient presents to the ED via EMS complaining of hematuria and discharge. Patient reports the symptoms started 3 days ago. Patient reports having a fever and is using ibuprofen. Patient reports receiving oral sex one week prior to his symptoms. Patient provided with his call bell.

## 2019-08-23 NOTE — ED PROVIDER NOTES
EMERGENCY DEPARTMENT HISTORY AND PHYSICAL EXAM      Date: 8/22/2019  Patient Name: Navarro Nunes  Patient Age and Sex: 52 y.o. male    History of Presenting Illness     Chief Complaint   Patient presents with    Blood in Urine     Pt reports hematuria x yesterday with fever. Denies urinary pain       History Provided By: Patient    HPI: Navarro Nunes, 52 y.o. male complains of painless penile discharge for the past couple days, with some blood noted in the discharge. He denies any pain or dysuria. No skin lesions. No testicular pain. He did not mention fevers to me. Of note patient recently received oral sex from a prostitute a few days ago, which he believes preceded his symptoms. No abdominal pain. Location: Copious white penile discharge, with some blood, no dysuria or pain  Quality:    Mild to moderate discharge  Severity: Moderate  Duration: Few days  Timing:    Constant  Context:  Recent oral sex from prostitute  Modifying factors: None  Associated symptoms: No fevers or abdominal pain    Pt denies any other alleviating or exacerbating factors. There are no other complaints, changes or physical findings at this time. Past Medical History:   Diagnosis Date    Hypertension     [facility]      History reviewed. No pertinent surgical history. PCP: Soraida, MD Nadege    Past History   Past Medical History:  Past Medical History:   Diagnosis Date    Hypertension     Schiz[facility]ffective disorder ([facility]        Past Surgical History:  History reviewed. No pertinent surgical history. Family History:  History reviewed. No pertinent family history.     Social History:  Social History     Tobacco Use    Smoking status: Current Every Day Smoker     Packs/day: 0.50     Types: Cigarettes     Start date: 9/1/2018    Smokeless tobacco: Never Used   Substance Use Topics    Alcohol use: Yes     Frequency: 4 or more times a week     Drinks per session: 10 or more     Binge frequency: Weekly  Drug use: Not Currently     Types: Cocaine       Allergies:  No Known Allergies    Current Medications:  No current facility-administered medications on file prior to encounter. Current Outpatient Medications on File Prior to Encounter   Medication Sig Dispense Refill    hydroCHLOROthiazide (MICROZIDE) 12.5 mg capsule Take 1 Cap by mouth daily. Review of Systems   Review of Systems   Genitourinary: Positive for discharge. Negative for dysuria, flank pain, frequency, genital sores, hematuria, penile pain and testicular pain. All other systems reviewed and are negative. Physical Exam   Vital Signs  Patient Vitals for the past 24 hrs:   Temp Pulse Resp BP SpO2   08/22/19 2026  77 16 (!) 147/95 98 %   08/22/19 1842 98.8 °F (37.1 °C) 79 16 (!) 156/96 97 %       Physical Exam   Constitutional: He is oriented to person, place, and time. He appears well-developed and well-nourished. No distress. Abdominal: Soft. He exhibits no distension. There is no tenderness. Genitourinary: Testes normal. Right testis shows no tenderness. Left testis shows no tenderness. Circumcised. Discharge found. Genitourinary Comments: Small amount of white discharge from the penis. Painless. No lesions. Neurological: He is alert and oriented to person, place, and time. Skin: Skin is warm and dry. No rash noted. Psychiatric: He has a normal mood and affect. His behavior is normal. Thought content normal.   Nursing note and vitals reviewed.       Diagnostic Study Results   Labs  Recent Results (from the past 24 hour(s))   URINALYSIS W/ REFLEX CULTURE    Collection Time: 08/22/19  6:54 PM   Result Value Ref Range    Color YELLOW/STRAW      Appearance CLOUDY (A) CLEAR      Specific gravity 1.029 1.003 - 1.030      pH (UA) 6.5 5.0 - 8.0      Protein NEGATIVE  NEG mg/dL    Glucose NEGATIVE  NEG mg/dL    Ketone NEGATIVE  NEG mg/dL    Bilirubin NEGATIVE  NEG      Blood NEGATIVE  NEG      Urobilinogen 1.0 0.2 - 1.0 EU/dL    Nitrites NEGATIVE  NEG      Leukocyte Esterase LARGE (A) NEG      WBC >100 (H) 0 - 4 /hpf    RBC 0-5 0 - 5 /hpf    Epithelial cells FEW FEW /lpf    Bacteria NEGATIVE  NEG /hpf    UA:UC IF INDICATED URINE CULTURE ORDERED (A) CNI      Mucus TRACE (A) NEG /lpf   CBC WITH AUTOMATED DIFF    Collection Time: 08/22/19  7:05 PM   Result Value Ref Range    WBC 7.7 4.1 - 11.1 K/uL    RBC 4.47 4.10 - 5.70 M/uL    HGB 12.6 12.1 - 17.0 g/dL    HCT 39.9 36.6 - 50.3 %    MCV 89.3 80.0 - 99.0 FL    MCH 28.2 26.0 - 34.0 PG    MCHC 31.6 30.0 - 36.5 g/dL    RDW 14.7 (H) 11.5 - 14.5 %    PLATELET 071 298 - 767 K/uL    MPV 11.6 8.9 - 12.9 FL    NRBC 0.0 0  WBC    ABSOLUTE NRBC 0.00 0.00 - 0.01 K/uL    NEUTROPHILS 60 32 - 75 %    LYMPHOCYTES 23 12 - 49 %    MONOCYTES 14 (H) 5 - 13 %    EOSINOPHILS 2 0 - 7 %    BASOPHILS 1 0 - 1 %    IMMATURE GRANULOCYTES 0 0.0 - 0.5 %    ABS. NEUTROPHILS 4.6 1.8 - 8.0 K/UL    ABS. LYMPHOCYTES 1.8 0.8 - 3.5 K/UL    ABS. MONOCYTES 1.1 (H) 0.0 - 1.0 K/UL    ABS. EOSINOPHILS 0.2 0.0 - 0.4 K/UL    ABS. BASOPHILS 0.0 0.0 - 0.1 K/UL    ABS. IMM. GRANS. 0.0 0.00 - 0.04 K/UL    DF AUTOMATED     METABOLIC PANEL, COMPREHENSIVE    Collection Time: 08/22/19  7:05 PM   Result Value Ref Range    Sodium 140 136 - 145 mmol/L    Potassium 4.3 3.5 - 5.1 mmol/L    Chloride 103 97 - 108 mmol/L    CO2 32 21 - 32 mmol/L    Anion gap 5 5 - 15 mmol/L    Glucose 94 65 - 100 mg/dL    BUN 17 6 - 20 MG/DL    Creatinine 1.39 (H) 0.70 - 1.30 MG/DL    BUN/Creatinine ratio 12 12 - 20      GFR est AA >60 >60 ml/min/1.73m2    GFR est non-AA 54 (L) >60 ml/min/1.73m2    Calcium 8.7 8.5 - 10.1 MG/DL    Bilirubin, total 0.4 0.2 - 1.0 MG/DL    ALT (SGPT) 62 12 - 78 U/L    AST (SGOT) 43 (H) 15 - 37 U/L    Alk.  phosphatase 86 45 - 117 U/L    Protein, total 7.6 6.4 - 8.2 g/dL    Albumin 3.7 3.5 - 5.0 g/dL    Globulin 3.9 2.0 - 4.0 g/dL    A-G Ratio 0.9 (L) 1.1 - 2.2         Radiologic Studies  No orders to display     CT Results (Last 48 hours)    None        CXR Results  (Last 48 hours)    None          Procedures     Procedures    Medical Decision Making     Provider Notes (Medical Decision Making):   80-year-old male with painless moderate/copious white penile discharge in the setting of high risk sexual practices. Likely gonorrhea or chlamydia. He was treated with ceftriaxone and azithromycin. Urethral swabs were sent. Advised safe sexual practices. No    Brittany Gu MD  9:27 PM        Consult required? No      Medications Administered During ED Course:  Medications   cefTRIAXone (ROCEPHIN) 250 mg in lidocaine (PF) (XYLOCAINE) 10 mg/mL (1 %) IM injection (250 mg IntraMUSCular Given 8/22/19 2121)   azithromycin (ZITHROMAX) tablet 1,000 mg (1,000 mg Oral Given 8/22/19 2121)          Diagnosis     Disposition:  Discharged    Clinical Impression:   1. Abnormal penile discharge, with blood        Attestation:  I personally performed the services described in this documentation on this date 8/22/2019 for patient Madeleine Strickland. Brittany Gu MD        I was the first provider for this patient on this visit. To the best of my ability I reviewed relevant prior medical records, electrocardiograms, laboratories, and radiologic studies. The patient's presenting problems were discussed, and the patient was in agreement with the care plan formulated and outlined with them. Please note that this dictation was completed with Dragon voice recognition software. Quite often unanticipated grammatical, syntax, homophones, and other interpretive errors are inadvertently transcribed by the computer software. Please disregard these errors and excuse any errors that have escaped final proofreading.

## 2019-08-24 LAB
BACTERIA SPEC CULT: NORMAL
CC UR VC: NORMAL
SERVICE CMNT-IMP: NORMAL

## 2019-08-26 LAB
C TRACH DNA SPEC QL NAA+PROBE: NEGATIVE
C TRACH SPEC QL CULT: NEGATIVE
N GONORRHOEA DNA SPEC QL NAA+PROBE: POSITIVE
SAMPLE TYPE: ABNORMAL
SERVICE CMNT-IMP: ABNORMAL
SPECIMEN SOURCE: ABNORMAL
SPECIMEN SOURCE: NORMAL

## 2020-01-24 ENCOUNTER — HOSPITAL ENCOUNTER (EMERGENCY)
Age: 50
Discharge: HOME | End: 2020-01-24
Attending: EMERGENCY MEDICINE
Payer: SELF-PAY

## 2020-01-24 VITALS
DIASTOLIC BLOOD PRESSURE: 74 MMHG | WEIGHT: 222 LBS | BODY MASS INDEX: 31.08 KG/M2 | RESPIRATION RATE: 14 BRPM | SYSTOLIC BLOOD PRESSURE: 135 MMHG | HEART RATE: 76 BPM | OXYGEN SATURATION: 100 % | TEMPERATURE: 97.6 F | HEIGHT: 71 IN

## 2020-01-24 DIAGNOSIS — L30.9 ECZEMA, UNSPECIFIED TYPE: Primary | ICD-10-CM

## 2020-01-24 PROCEDURE — 99282 EMERGENCY DEPT VISIT SF MDM: CPT

## 2020-01-24 RX ORDER — PREDNISONE 10 MG/1
TABLET ORAL
Qty: 21 TAB | Refills: 0 | Status: SHIPPED | OUTPATIENT
Start: 2020-01-24

## 2020-01-24 NOTE — DISCHARGE INSTRUCTIONS
Use Aquaphor, Aveeno or Cetaphil eczema creams as needed. Patient Education        Atopic Dermatitis: Care Instructions  Your Care Instructions  Atopic dermatitis (also called eczema) is a skin problem that causes intense itching and a red, raised rash. In severe cases, the rash develops clear fluid-filled blisters. The rash is not contagious. People with this condition seem to have very sensitive immune systems that are likely to react to things that cause allergies. The immune system is the body's way of fighting infection. There is no cure for atopic dermatitis, but you may be able to control it with care at home. Follow-up care is a key part of your treatment and safety. Be sure to make and go to all appointments, and call your doctor if you are having problems. It's also a good idea to know your test results and keep a list of the medicines you take. How can you care for yourself at home? · Use moisturizer at least twice a day. · If your doctor prescribes a cream, use it as directed. If your doctor prescribes other medicine, take it exactly as directed. · Wash the affected area with water only. Soap can make dryness and itching worse. Pat dry. · Apply a moisturizer after bathing. Use a cream such as Lubriderm, Moisturel, or Cetaphil that does not irritate the skin or cause a rash. Apply the cream while your skin is still damp after lightly drying with a towel. · Use cold, wet cloths to reduce itching. · Keep cool, and stay out of the sun. · If itching affects your normal activities, an over-the-counter antihistamine, such as diphenhydramine (Benadryl) or loratadine (Claritin) may help. Read and follow all instructions on the label. When should you call for help?   Call your doctor now or seek immediate medical care if:    · Your rash gets worse and you have a fever.     · You have new blisters or bruises, or the rash spreads and looks like a sunburn.     · You have signs of infection, such as:  ? Increased pain, swelling, warmth, or redness. ? Red streaks leading from the rash. ? Pus draining from the rash. ? A fever.     · You have crusting or oozing sores.     · You have joint aches or body aches along with your rash.    Watch closely for changes in your health, and be sure to contact your doctor if:    · Your rash does not clear up after 2 to 3 weeks of home treatment.     · Itching interferes with your sleep or daily activities. Where can you learn more? Go to http://luis alfredo-miguel ángel.info/. Enter Q271 in the search box to learn more about \"Atopic Dermatitis: Care Instructions. \"  Current as of: April 1, 2019  Content Version: 12.2  © 2895-4560 [employer]. Care instructions adapted under license by [employer] (which disclaims liability or warranty for this information). If you have questions about a medical condition or this instruction, always ask your healthcare professional. [place] [address] any warranty or liability for your use of this information.

## 2020-01-24 NOTE — ED PROVIDER NOTES
EMERGENCY DEPARTMENT HISTORY AND PHYSICAL EXAM    Date: 1/24/2020  Patient Name: Isrrael Reza    History of Presenting Illness     Chief Complaint   Patient presents with    Leg Pain         History Provided By: Patient    4:44 PM  Isrrael Reza is a 52 y.o. male with PMHX of hypretension and eczema who presents to the emergency department C/O itchy dry skin to bilateral lower legs which is been there for many months. Legs have become swollen and uncomfortable now. Patient states he just got out of senior care and was told it was related to the [place] water[place] there. He was seen at another ED yesterday, diagnosed with eczema and prescribed triamcinolone cream which she has not yet filled. He is here with other family members and wanted another opinion. Pt denies fever, extremity numbness weakness or wounds, and any other sxs or complaints. PCP: None    Current Outpatient Medications   Medication Sig Dispense Refill    predniSONE (STERAPRED DS) 10 mg dose pack Use per pack instructions. 21 Tab 0    lisinopril (PRINIVIL, ZESTRIL) 5 mg tablet Take 1 Tab by mouth daily. 30 Tab 1       Past History     Past Medical History:  Past Medical History:   Diagnosis Date    Glaucoma     HTN (hypertension)     Hypertension     Migraine     Vertigo        Past Surgical History:  Past Surgical History:   Procedure Laterality Date    HX FEMUR FRACTURE TX      HX ORTHOPAEDIC         Family History:  No family history on file. Social History:  Social History     Tobacco Use    Smoking status: Current Every Day Smoker     Packs/day: 0.50   Substance Use Topics    Alcohol use: No     Comment: drank last night    Drug use: No     Types: Cocaine     Comment: past not currently using       Allergies:  No Known Allergies      Review of Systems   Review of Systems   Constitutional: Negative for fever. Musculoskeletal: Positive for joint swelling. Skin: Positive for rash.    All other systems reviewed and are negative. Physical Exam     Vitals:    01/24/20 1610   BP: 135/74   Pulse: 76   Resp: 14   Temp: 97.6 °F (36.4 °C)   SpO2: 100%   Weight: 100.7 kg (222 lb)   Height: 5' 11\" (1.803 m)     Physical Exam  Vital signs and nursing notes reviewed. CONSTITUTIONAL: Alert. Well-appearing; well-nourished; in no apparent distress. EXT: Normal ROM in all four extremities; non-tender to palpation. SKIN: BLE: Bilateral lower legs are extremely dry with many coalescing dry plaques with flaking of skin; legs are slightly swollen but not tender and without erythema or open wounds. Distal sensation intact. 2+ DP pulse. Scattered dried papules bilateral forearms  NEURO: A & O x3. PSYCH:  Mood and affect appropriate. Diagnostic Study Results     Labs -   No results found for this or any previous visit (from the past 12 hour(s)). Radiologic Studies - none  No orders to display     CT Results  (Last 48 hours)    None        CXR Results  (Last 48 hours)    None          Medications given in the ED-  Medications - No data to display      Medical Decision Making   I am the first provider for this patient. I reviewed the vital signs, available nursing notes, past medical history, past surgical history, family history and social history. Vital Signs-Reviewed the patient's vital signs. Records Reviewed: Nursing Notes      Procedures:  Procedures    ED Course:  4:44 PM   Initial assessment performed. The patients presenting problems have been discussed, and they are in agreement with the care plan formulated and outlined with them. I have encouraged them to ask questions as they arise throughout their visit. Provider Notes (Medical Decision Making): Rosy Snider is a 52 y.o. male presents with persistent dry itchy skin to bilateral lower legs. No injury trauma or signs of secondary infection. Consistent with severe eczema, autoimmune process such as psoriasis also possible.   He was already prescribed triamcinolone ointment yesterday which I agree with as well as oral steroid and eczema creams,  consult placed for follow-up. Diagnosis and Disposition       DISCHARGE NOTE:    Devorah Epley  results have been reviewed with him. He has been counseled regarding his diagnosis, treatment, and plan. He verbally conveys understanding and agreement of the signs, symptoms, diagnosis, treatment and prognosis and additionally agrees to follow up as discussed. He also agrees with the care-plan and conveys that all of his questions have been answered. I have also provided discharge instructions for him that include: educational information regarding their diagnosis and treatment, and list of reasons why they would want to return to the ED prior to their follow-up appointment, should his condition change. He has been provided with education for proper emergency department utilization. CLINICAL IMPRESSION:    1. Eczema, unspecified type        PLAN:  1. D/C Home  2. Current Discharge Medication List      START taking these medications    Details   predniSONE (STERAPRED DS) 10 mg dose pack Use per pack instructions. Qty: 21 Tab, Refills: 0           3. Follow-up Information     Follow up With Specialties Details Why [address] [place] [address] Schedule an appointment as soon as possible for a visit  [address] [facility]. [address] [place] [address] ([facility]) [phone]    [facility] EMERGENCY DEPT Emergency Medicine  As needed, If symptoms worsen [address] [id number] [phone]        _______________________________      Please note that this dictation was completed with Kelan, the [employer] voice recognition software. Quite often unanticipated grammatical, syntax, homophones, and other interpretive errors are inadvertently transcribed by the computer software. Please disregard these errors.   Please excuse any errors that have escaped final proofreading.